# Patient Record
Sex: MALE | Race: ASIAN | NOT HISPANIC OR LATINO | URBAN - METROPOLITAN AREA
[De-identification: names, ages, dates, MRNs, and addresses within clinical notes are randomized per-mention and may not be internally consistent; named-entity substitution may affect disease eponyms.]

---

## 2023-01-28 ENCOUNTER — EMERGENCY (EMERGENCY)
Facility: HOSPITAL | Age: 43
LOS: 1 days | Discharge: HOME | End: 2023-01-28
Attending: STUDENT IN AN ORGANIZED HEALTH CARE EDUCATION/TRAINING PROGRAM | Admitting: EMERGENCY MEDICINE
Payer: COMMERCIAL

## 2023-01-28 VITALS
DIASTOLIC BLOOD PRESSURE: 86 MMHG | OXYGEN SATURATION: 98 % | SYSTOLIC BLOOD PRESSURE: 127 MMHG | TEMPERATURE: 98 F | RESPIRATION RATE: 18 BRPM | HEART RATE: 114 BPM

## 2023-01-28 LAB
ALBUMIN SERPL ELPH-MCNC: 4 G/DL — SIGNIFICANT CHANGE UP (ref 3.3–5)
ALP SERPL-CCNC: 66 U/L — SIGNIFICANT CHANGE UP (ref 40–120)
ALT FLD-CCNC: 28 U/L — SIGNIFICANT CHANGE UP (ref 4–41)
ANION GAP SERPL CALC-SCNC: 9 MMOL/L — SIGNIFICANT CHANGE UP (ref 7–14)
APTT BLD: 28.7 SEC — SIGNIFICANT CHANGE UP (ref 27–36.3)
AST SERPL-CCNC: 20 U/L — SIGNIFICANT CHANGE UP (ref 4–40)
BASOPHILS # BLD AUTO: 0.05 K/UL — SIGNIFICANT CHANGE UP (ref 0–0.2)
BASOPHILS NFR BLD AUTO: 0.3 % — SIGNIFICANT CHANGE UP (ref 0–2)
BILIRUB SERPL-MCNC: 0.2 MG/DL — SIGNIFICANT CHANGE UP (ref 0.2–1.2)
BUN SERPL-MCNC: 10 MG/DL — SIGNIFICANT CHANGE UP (ref 7–23)
CALCIUM SERPL-MCNC: 9.7 MG/DL — SIGNIFICANT CHANGE UP (ref 8.4–10.5)
CHLORIDE SERPL-SCNC: 100 MMOL/L — SIGNIFICANT CHANGE UP (ref 98–107)
CO2 SERPL-SCNC: 28 MMOL/L — SIGNIFICANT CHANGE UP (ref 22–31)
CREAT SERPL-MCNC: 0.61 MG/DL — SIGNIFICANT CHANGE UP (ref 0.5–1.3)
EGFR: 122 ML/MIN/1.73M2 — SIGNIFICANT CHANGE UP
EOSINOPHIL # BLD AUTO: 0.08 K/UL — SIGNIFICANT CHANGE UP (ref 0–0.5)
EOSINOPHIL NFR BLD AUTO: 0.5 % — SIGNIFICANT CHANGE UP (ref 0–6)
GLUCOSE SERPL-MCNC: 160 MG/DL — HIGH (ref 70–99)
HCT VFR BLD CALC: 36.2 % — LOW (ref 39–50)
HGB BLD-MCNC: 11.9 G/DL — LOW (ref 13–17)
IANC: 10.46 K/UL — HIGH (ref 1.8–7.4)
IMM GRANULOCYTES NFR BLD AUTO: 0.5 % — SIGNIFICANT CHANGE UP (ref 0–0.9)
INR BLD: 1.14 RATIO — SIGNIFICANT CHANGE UP (ref 0.88–1.16)
LACTATE BLDV-MCNC: 1.3 MMOL/L — SIGNIFICANT CHANGE UP (ref 0.5–2)
LYMPHOCYTES # BLD AUTO: 20.7 % — SIGNIFICANT CHANGE UP (ref 13–44)
LYMPHOCYTES # BLD AUTO: 3.2 K/UL — SIGNIFICANT CHANGE UP (ref 1–3.3)
MCHC RBC-ENTMCNC: 29 PG — SIGNIFICANT CHANGE UP (ref 27–34)
MCHC RBC-ENTMCNC: 32.9 GM/DL — SIGNIFICANT CHANGE UP (ref 32–36)
MCV RBC AUTO: 88.3 FL — SIGNIFICANT CHANGE UP (ref 80–100)
MONOCYTES # BLD AUTO: 1.59 K/UL — HIGH (ref 0–0.9)
MONOCYTES NFR BLD AUTO: 10.3 % — SIGNIFICANT CHANGE UP (ref 2–14)
NEUTROPHILS # BLD AUTO: 10.46 K/UL — HIGH (ref 1.8–7.4)
NEUTROPHILS NFR BLD AUTO: 67.7 % — SIGNIFICANT CHANGE UP (ref 43–77)
NRBC # BLD: 0 /100 WBCS — SIGNIFICANT CHANGE UP (ref 0–0)
NRBC # FLD: 0 K/UL — SIGNIFICANT CHANGE UP (ref 0–0)
PLATELET # BLD AUTO: 190 K/UL — SIGNIFICANT CHANGE UP (ref 150–400)
POTASSIUM SERPL-MCNC: 4.5 MMOL/L — SIGNIFICANT CHANGE UP (ref 3.5–5.3)
POTASSIUM SERPL-SCNC: 4.5 MMOL/L — SIGNIFICANT CHANGE UP (ref 3.5–5.3)
PROT SERPL-MCNC: 7.4 G/DL — SIGNIFICANT CHANGE UP (ref 6–8.3)
PROTHROM AB SERPL-ACNC: 13.3 SEC — SIGNIFICANT CHANGE UP (ref 10.5–13.4)
RBC # BLD: 4.1 M/UL — LOW (ref 4.2–5.8)
RBC # FLD: 13.2 % — SIGNIFICANT CHANGE UP (ref 10.3–14.5)
SODIUM SERPL-SCNC: 137 MMOL/L — SIGNIFICANT CHANGE UP (ref 135–145)
WBC # BLD: 15.46 K/UL — HIGH (ref 3.8–10.5)
WBC # FLD AUTO: 15.46 K/UL — HIGH (ref 3.8–10.5)

## 2023-01-28 PROCEDURE — 99285 EMERGENCY DEPT VISIT HI MDM: CPT

## 2023-01-28 PROCEDURE — 70496 CT ANGIOGRAPHY HEAD: CPT | Mod: 26,MA

## 2023-01-28 RX ORDER — ACETAMINOPHEN 500 MG
650 TABLET ORAL ONCE
Refills: 0 | Status: COMPLETED | OUTPATIENT
Start: 2023-01-28 | End: 2023-01-28

## 2023-01-28 RX ORDER — METOCLOPRAMIDE HCL 10 MG
10 TABLET ORAL ONCE
Refills: 0 | Status: COMPLETED | OUTPATIENT
Start: 2023-01-28 | End: 2023-01-28

## 2023-01-28 RX ORDER — MORPHINE SULFATE 50 MG/1
4 CAPSULE, EXTENDED RELEASE ORAL ONCE
Refills: 0 | Status: DISCONTINUED | OUTPATIENT
Start: 2023-01-28 | End: 2023-01-28

## 2023-01-28 RX ORDER — SODIUM CHLORIDE 9 MG/ML
2000 INJECTION INTRAMUSCULAR; INTRAVENOUS; SUBCUTANEOUS ONCE
Refills: 0 | Status: COMPLETED | OUTPATIENT
Start: 2023-01-28 | End: 2023-01-28

## 2023-01-28 RX ADMIN — SODIUM CHLORIDE 2000 MILLILITER(S): 9 INJECTION INTRAMUSCULAR; INTRAVENOUS; SUBCUTANEOUS at 22:28

## 2023-01-28 RX ADMIN — MORPHINE SULFATE 4 MILLIGRAM(S): 50 CAPSULE, EXTENDED RELEASE ORAL at 19:36

## 2023-01-28 RX ADMIN — Medication 10 MILLIGRAM(S): at 19:36

## 2023-01-28 RX ADMIN — Medication 650 MILLIGRAM(S): at 19:36

## 2023-01-28 NOTE — ED PROVIDER NOTE - PROGRESS NOTE DETAILS
discussed case with ENT they recommended trial of deflation of Rhino Rocket however patient immediately started bleeding again.  Rhino Rocket was reinflated.  Pending CT read as well as ENT consult. Dr. Nathan Fontana DO (ED ATTENDING):  CT with no acute findings    Seen by ENT in ED, given Afrin and nasal spray, deemed stable for dc home with f/u

## 2023-01-28 NOTE — ED PROVIDER NOTE - NSFOLLOWUPCLINICS_GEN_ALL_ED_FT
[facility] - ENT  Otolaryngology (ENT)  [address] [place] [address]  Phone: [phone]  Fax:   Follow Up Time: 7-10 Days

## 2023-01-28 NOTE — ED ADULT NURSE NOTE - OBJECTIVE STATEMENT
patient Alert & ox3.pt had nose bleed while [facility] pt had it packed with balloon in place  and told to go to ENT to be removed/ pt c/o severe pounding in head and swelling to left eye and neck pain .pt . evaluated by MD.Placed 20g angiocath left AC., labs drawn and sent.  patient will be waiting for results, further evaluation and disposition.  made comfortable.will continue to monitor.

## 2023-01-28 NOTE — ED PROVIDER NOTE - PHYSICAL EXAMINATION
CONSTITUTIONAL: Non-toxic, non-diaphoretic, Patient is uncomfortable appearing  HEAD: Normocephalic; atraumatic  EYES: EOM intact   ENMT: External appears normal; normal oropharynx, moist, .  He has a left-sided Rhino Rocket in.  There is no active bleeding.    NECK: grossly normal active ROM,  CARD: No cyanosis, good peripheral perfusion,   RESP: Normal chest excursion with respiration; no increased work of breathing  ABD: non-distended   EXT: moving all extremities, no gross disfigurement or asymmetry,  SKIN: Warm, dry, no rash  NEURO:  moving all extremities, no facial droop, no dysarthria      cn2-12 intact  5/5 all extremities

## 2023-01-28 NOTE — ED PROVIDER NOTE - OBJECTIVE STATEMENT
Patient is a 42-year-old male past medical history of hypertension and hyperlipidemia, crvo, who is presenting to the emergency department with severe left-sided facial pain.  3 days ago was in [facility] had nosebleeding.  Initially the bleeding was stopped with spray used in the ED.  Then it bled more.  So he went back to the ED there they placed a short Rhino Rocket.  He was discharged.  It began bleeding again so a longer Rhino Rocket was placed and he was discharged.  The patient says the Rhino Rocket is irritating his left face and his left eye is tearing.  She has pain in the left sinus.  Was given codeine No. 3 for the pain but it is not helping.

## 2023-01-28 NOTE — ED PROVIDER NOTE - CLINICAL SUMMARY MEDICAL DECISION MAKING FREE TEXT BOX
Patient is a 42-year-old male past medical history of hypertension and hyperlipidemia, crvo, who is presenting to the emergency department with severe left-sided facial pain.  3 days ago was in [facility] had nosebleeding.  Initially the bleeding was stopped with spray used in the ED.  Then it bled more.  So he went back to the ED there they placed a short Rhino Rocket.  He was discharged.  It began bleeding again so a longer Rhino Rocket was placed and he was discharged.  The patient says the Rhino Rocket is irritating his left face and his left eye is tearing.  She has pain in the left sinus.  Was given codeine No. 3 for the pain but it is not helping.  Patient is uncomfortable appearing, neuro intact.  Ambulatory.  He has a left-sided Rhino Rocket in.  There is no active bleeding.  Patient was told to follow-up with the ENT on Monday for removal.  Offered trial of removal in the emergency department with monitoring to watch for rebleeding, however patient did not want to do that.  We will get basic labs, parenteral narcotics for pain because he is failing outpatient treatment for pain.  Of note patient was already started on Augmentin and has been taking that medication.  Will obtain CT imaging to rule out cavernous venous sinus thrombosis as well as active bleeding.  Pain well controlled will likely be stable for follow-up as outpatient if work-up negative in the emergency department

## 2023-01-28 NOTE — ED PROVIDER NOTE - NSFOLLOWUPINSTRUCTIONS_ED_ALL_ED_FT
Please you were seen in the emergency room for epistaxis (bleeding nose).    After evaluation by the ER and ENT teams, you were deemed stable for discharge home.    Please use the Afrin nasal spray 3 times a day to keep the blood vessels constricted to prevent bleeding.  Please use the saline nasal spray 3 times a day in both nostrils to keep the nasal mucosa moist    Please take the antibiotics (Augmentin) twice a day for 5 days.  This was sent to your pharmacy    Please follow-up with ENT in 1 week    Please return to the ER for any new or worsening symptoms.

## 2023-01-28 NOTE — ED ADULT TRIAGE NOTE - CHIEF COMPLAINT QUOTE
[place] balloon in place  and told to go to ENT to be removed/ pt c/o severe pounding in head and swelling to left eye and neck pain / pt on tylenol w/ codiene but its not helping

## 2023-01-28 NOTE — ED PROVIDER NOTE - PATIENT PORTAL LINK FT
You can access the FollowMyHealth Patient Portal offered by [facility] by registering at the following website: [email or web address]. By joining [facility]’s FollowMyHealth portal, you will also be able to view your health information using other applications (apps) compatible with our system.

## 2023-01-29 VITALS
HEART RATE: 103 BPM | RESPIRATION RATE: 16 BRPM | OXYGEN SATURATION: 98 % | SYSTOLIC BLOOD PRESSURE: 135 MMHG | DIASTOLIC BLOOD PRESSURE: 93 MMHG | TEMPERATURE: 98 F

## 2023-01-29 LAB
FLUAV AG NPH QL: SIGNIFICANT CHANGE UP
FLUBV AG NPH QL: SIGNIFICANT CHANGE UP
RSV RNA NPH QL NAA+NON-PROBE: SIGNIFICANT CHANGE UP
SARS-COV-2 RNA SPEC QL NAA+PROBE: SIGNIFICANT CHANGE UP

## 2023-01-29 RX ORDER — CEPHALEXIN 500 MG
500 CAPSULE ORAL ONCE
Refills: 0 | Status: COMPLETED | OUTPATIENT
Start: 2023-01-29 | End: 2023-01-29

## 2023-01-29 RX ADMIN — Medication 500 MILLIGRAM(S): at 00:40

## 2023-01-29 NOTE — ED ADULT NURSE REASSESSMENT NOTE - NS ED NURSE REASSESS COMMENT FT1
Pt in INT room 12. A&Ox4, amb at baseline. Pt nose suctioned and assessed by EMT. Pt vitally stable. Denies complaints at this time. Bleeding has stopped from L nare. IV removed awaiting discharge paperwork

## 2023-01-29 NOTE — CONSULT NOTE ADULT - SUBJECTIVE AND OBJECTIVE BOX
HPI:  Patient is a 42y Male with PMH significant for HTN, HLD, central retinal vein occlusion (for which he gets intraocular injections) who presents to the ED with left-sided facial pain in the setting of rhinorocket placement for past 3 days. Per patient, his first episode of epistaxis started on Tuesday for which he originally when to see a physician where it resolved with afrin spray and pressure, rebled and a short rhinorocket was placed. He then went to see an ENT as it bled further who removed short rhinorocket and placed posterior rhinorocket which he has now had in place since Thursday evening. He has been taking augmentin and acetaminophen-codeine but has had persistent left-facial swelling, pain, and left eye tearing that is unbearable. He was told to return to the ED on Monday but he could not weight that long. He has had subjective fevers at home. He denies any further dripping down back of the throat, N/V, or dizziness.    In ED, an attempt was made to deflate the RR but it was immediately re-inflated as a trail of blood began to form. There was 5cc in place at my evaluation.    Physical Exam  T(C): 36.4 (01-28-23 @ 22:23), Max: 36.7 (01-28-23 @ 18:31)  HR: 98 (01-28-23 @ 22:23) (98 - 114)  BP: 126/87 (01-28-23 @ 22:23) (126/87 - 127/86)  RR: 16 (01-28-23 @ 22:23) (16 - 18)  SpO2: 99% (01-28-23 @ 22:23) (98% - 99%)  General: NAD, A+Ox3  No respiratory distress, stridor, or stertor  Voice quality: normal  Face: Mild left-sided malar swelling and minimal eye swelling  OU: PERRL, EOMI  Nose: right nasal cavity clear, left nasal cavity with inflated rhinorocket covered in dry blood crusting and dried blood crusting along left-side of philtrum   OC/OP: tongue normal, floor of mouth wnl, no masses or lesions, OP clear, no active dripping down back of throat  Neck: soft/flat, no LAD  CNII-XII intact    Assessment and Plan:   42y year old Male with PMH significant for HTN, HLD, central retinal vein occlusion (for which he gets intraocular injections) who presents to the ED with left-sided facial pain in the setting of rhinorocket placement for past 3 days. Patient now with no further bleeding. Tachycardic in ED but afebrile, likely 2/2 pain. WBC 15. H/H stable. Facial swelling can be normal and expected after having packing in place for so long. CT venogram obtained by ED for concern for thrombosis which is negative. Partial maxillary/ethmoid sinus opacification is normal in this setting and likely 2/2 old dried blood accumulation. Rhinorocket fully deflated at bedside and no significant bleeding noted. Mustache dressing placed underneath.   - explained to patient to notify nurse if any dripping down back of throat  - recommend keflex while in-house for nasal packing coverage  - have afrin at bedside  - will return in 1 hour to re-evaluate, if no persistent bleeding, will remove packing

## 2023-01-29 NOTE — POST DISCHARGE NOTE - NOTIFICATION:
patient says no more bleeding, has augmentin from previous visit at [facility], but pending new augmentin from his pharmacy. pain improved.     (Charles Palm MD; attending emergency medicine and medical toxicology)

## 2023-01-29 NOTE — PROGRESS NOTE ADULT - SUBJECTIVE AND OBJECTIVE BOX
Brief ORL Note    No further bleeding since rhinorocket deflated. Afrin and saline sprays sprayed around crusted anterior rhinorocket which was gently removed. Dried blood falling from back of throat but otherwise no active bleeding noted. Small strip of surgicel placed over anterior septum. Patient clear for dc as no further bleeding.  - afrin BIDx3d PRN for bleeding  - nasal saline sprays to keep mucosa moist  - if another episode, spray afrin and pinch nose for 30 minutes  - can follow-up outpatient

## 2023-02-03 LAB
CULTURE RESULTS: SIGNIFICANT CHANGE UP
CULTURE RESULTS: SIGNIFICANT CHANGE UP
SPECIMEN SOURCE: SIGNIFICANT CHANGE UP
SPECIMEN SOURCE: SIGNIFICANT CHANGE UP